# Patient Record
(demographics unavailable — no encounter records)

---

## 2025-07-11 NOTE — ASSESSMENT
[Vaccines Reviewed] : Immunizations reviewed today. Please see immunization details in the vaccine log within the immunization flowsheet.  [FreeTextEntry1] : #HCM -pap smear UTD -f/u labs  #HTN -BP stable -continue amlodipine

## 2025-07-11 NOTE — HEALTH RISK ASSESSMENT
[Yes] : Yes [2 - 3 times a week (3 pts)] : 2 - 3  times a week (3 points) [1 or 2 (0 pts)] : 1 or 2 (0 points) [Never (0 pts)] : Never (0 points) [Alone] : lives alone [Employed] : employed [Single] : single [Former] : Former [0-4] : 0-4 [Sexually Active] : not sexually active [de-identified] :

## 2025-07-11 NOTE — HISTORY OF PRESENT ILLNESS
[FreeTextEntry1] : CPE  [de-identified] : 33 F PMH HTN presents for CPE. Pt feels well, no acute complaints. Had seen cardio and referred to kidney doctor for abnormal imaging, workup all WNL. Started on BP medication. At time was also very stressed and now has also lost 65-75 pounds. Will be switching from OCP to IUD